# Patient Record
Sex: MALE | Race: WHITE
[De-identification: names, ages, dates, MRNs, and addresses within clinical notes are randomized per-mention and may not be internally consistent; named-entity substitution may affect disease eponyms.]

---

## 2022-04-07 PROBLEM — Z00.00 ENCOUNTER FOR PREVENTIVE HEALTH EXAMINATION: Status: ACTIVE | Noted: 2022-04-07

## 2022-08-05 ENCOUNTER — APPOINTMENT (OUTPATIENT)
Dept: UROLOGY | Facility: CLINIC | Age: 57
End: 2022-08-05

## 2022-08-05 VITALS
SYSTOLIC BLOOD PRESSURE: 130 MMHG | TEMPERATURE: 97.8 F | BODY MASS INDEX: 33.95 KG/M2 | DIASTOLIC BLOOD PRESSURE: 89 MMHG | HEIGHT: 68 IN | WEIGHT: 224 LBS | HEART RATE: 97 BPM

## 2022-08-05 PROCEDURE — 99203 OFFICE O/P NEW LOW 30 MIN: CPT

## 2022-08-05 NOTE — HISTORY OF PRESENT ILLNESS
[FreeTextEntry1] : Very pleasant 57-year-old gentleman who presents for evaluation of scrotal edema.  Patient is a poor historian.  He does not remember when scrotal edema started.  Review of records demonstrates that he was evaluated by a vascular surgeon in March 2022 for lower extremity edema.  He denies dysuria.  No hematuria.  No flank pain or suprapubic pain.  Creatinine at that time was 0.79.  Hemoglobin A1c 7.6%.

## 2022-08-05 NOTE — ASSESSMENT
[FreeTextEntry1] : Very pleasant 57-year-old gentleman who presents for evaluation of scrotal edema\par -We discussed potential etiologies of scrotal edema\par -Creatinine 0.79\par -Hemoglobin A1c 7.6%\par -WBC 6.4 with normal H&H\par -Scrotal support\par -We discussed the likelihood of scrotal edema being related to lower extremity edema\par -Evaluate for cardiovascular etiologies of lower extremity edema\par -Follow-up in 3 months

## 2022-08-05 NOTE — PHYSICAL EXAM
[General Appearance - Well Developed] : well developed [General Appearance - Well Nourished] : well nourished [Normal Appearance] : normal appearance [Well Groomed] : well groomed [General Appearance - In No Acute Distress] : no acute distress [Respiration, Rhythm And Depth] : normal respiratory rhythm and effort [Exaggerated Use Of Accessory Muscles For Inspiration] : no accessory muscle use [Abdomen Soft] : soft [Abdomen Tenderness] : non-tender [Costovertebral Angle Tenderness] : no ~M costovertebral angle tenderness [Urethral Meatus] : meatus normal [Urinary Bladder Findings] : the bladder was normal on palpation [Scrotum] : the scrotum was normal [Testes Mass (___cm)] : there were no testicular masses [Normal Station and Gait] : the gait and station were normal for the patient's age [FreeTextEntry1] : uses a wheelchair [] : no rash [No Focal Deficits] : no focal deficits [Oriented To Time, Place, And Person] : oriented to person, place, and time [Affect] : the affect was normal [Mood] : the mood was normal [Not Anxious] : not anxious [No Palpable Adenopathy] : no palpable adenopathy

## 2022-11-08 ENCOUNTER — APPOINTMENT (OUTPATIENT)
Dept: UROLOGY | Facility: CLINIC | Age: 57
End: 2022-11-08

## 2022-11-08 DIAGNOSIS — N50.89 OTHER SPECIFIED DISORDERS OF THE MALE GENITAL ORGANS: ICD-10-CM

## 2022-11-08 PROCEDURE — 99213 OFFICE O/P EST LOW 20 MIN: CPT

## 2022-11-08 NOTE — HISTORY OF PRESENT ILLNESS
[Currently Experiencing ___] :  [unfilled] [FreeTextEntry1] : Mr. Guan is a very pleasant 57 year old man here today for history of scrotal edema.\par Reports he has been doing well since he was here last.\par Reports that the edema in the scrotal has gotten slightly better.\par Denies any hematuria, dysuria or urinary retention.

## 2022-11-08 NOTE — ASSESSMENT
[FreeTextEntry1] : Mr. Guan is a very pleasant 57 year old man here today for history of scrotal edema.\par Reports he has been doing well since he was here last.\par Reports that the edema in the scrotal has gotten slightly better.\par Denies any hematuria, dysuria or urinary retention.\par PE scrotal edema improvement.\par Follow up as needed with Dr. Samuel in [place] per request for location.\par

## 2022-11-08 NOTE — PHYSICAL EXAM
[General Appearance - Well Developed] : well developed [General Appearance - Well Nourished] : well nourished [Normal Appearance] : normal appearance [Well Groomed] : well groomed [General Appearance - In No Acute Distress] : no acute distress [Abdomen Soft] : soft [Abdomen Tenderness] : non-tender [Costovertebral Angle Tenderness] : no ~M costovertebral angle tenderness [Edema] : no peripheral edema [] : no respiratory distress [Respiration, Rhythm And Depth] : normal respiratory rhythm and effort [Exaggerated Use Of Accessory Muscles For Inspiration] : no accessory muscle use [Oriented To Time, Place, And Person] : oriented to person, place, and time [Affect] : the affect was normal [Mood] : the mood was normal [Not Anxious] : not anxious [Normal Station and Gait] : the gait and station were normal for the patient's age [No Focal Deficits] : no focal deficits [No Palpable Adenopathy] : no palpable adenopathy [FreeTextEntry1] : PE scrotal edema improvement.